# Patient Record
Sex: MALE | Race: WHITE | Employment: FULL TIME | URBAN - METROPOLITAN AREA
[De-identification: names, ages, dates, MRNs, and addresses within clinical notes are randomized per-mention and may not be internally consistent; named-entity substitution may affect disease eponyms.]

---

## 2017-03-17 DIAGNOSIS — I10 HYPERTENSION GOAL BP (BLOOD PRESSURE) < 140/90: Primary | ICD-10-CM

## 2017-03-17 RX ORDER — HYDROCHLOROTHIAZIDE 25 MG/1
TABLET ORAL
Qty: 90 TABLET | Refills: 0 | Status: SHIPPED | OUTPATIENT
Start: 2017-03-17 | End: 2017-07-03

## 2017-03-17 NOTE — TELEPHONE ENCOUNTER
Rx request for med Hydrochlorothiazide 25 mg from [facility].     LOV 12/30/16 with Dr. Kosta Almonte for skin tag removal.    LOV 11/16/16 with Dr. Kosta Almonte for osteoarthritis of left wrist, hypertension, epilepsy, and pre-op exam.    Last CMP done on 11/26/16, Joanna Mckeon

## 2017-04-12 ENCOUNTER — TELEPHONE (OUTPATIENT)
Dept: FAMILY MEDICINE CLINIC | Facility: CLINIC | Age: 62
End: 2017-04-12

## 2017-04-12 ENCOUNTER — OFFICE VISIT (OUTPATIENT)
Dept: FAMILY MEDICINE CLINIC | Facility: CLINIC | Age: 62
End: 2017-04-12

## 2017-04-12 VITALS
HEIGHT: 70 IN | SYSTOLIC BLOOD PRESSURE: 140 MMHG | DIASTOLIC BLOOD PRESSURE: 80 MMHG | BODY MASS INDEX: 33.79 KG/M2 | HEART RATE: 72 BPM | WEIGHT: 236 LBS | TEMPERATURE: 98 F | OXYGEN SATURATION: 99 % | RESPIRATION RATE: 12 BRPM

## 2017-04-12 DIAGNOSIS — M79.10 MUSCLE ACHE: Primary | ICD-10-CM

## 2017-04-12 PROCEDURE — 99213 OFFICE O/P EST LOW 20 MIN: CPT | Performed by: NURSE PRACTITIONER

## 2017-04-12 RX ORDER — CYCLOBENZAPRINE HCL 10 MG
10 TABLET ORAL 3 TIMES DAILY PRN
Qty: 30 TABLET | Refills: 0 | Status: SHIPPED | OUTPATIENT
Start: 2017-04-12 | End: 2017-04-20

## 2017-04-12 RX ORDER — NAPROXEN 500 MG/1
500 TABLET ORAL 2 TIMES DAILY WITH MEALS
Qty: 20 TABLET | Refills: 0 | Status: SHIPPED | OUTPATIENT
Start: 2017-04-12 | End: 2017-04-27

## 2017-04-12 NOTE — PROGRESS NOTES
CHIEF COMPLAINT:     Patient presents with:  Spasms: right upper shoulder area      HPI:   Christine Kemp is a 63year old male who is here for complaints muscle pain. Pain is located at trapezius. Pain is described as aching, sharp.  Severity is moderat /80 mmHg  Pulse 72  Temp(Src) 97.8 °F (36.6 °C) (Oral)  Resp 12  Ht 70\"  Wt 236 lb  BMI 33.86 kg/m2  SpO2 99%   GENERAL: well developed, well nourished,in no apparent distress  SKIN: no rashes,no suspicious lesions  NECK: supple,no adenopathy,no bru Acute neck and back pain usually gets better in 1 to 2 weeks. Pain related to disk disease, arthritis in the spinal joints or spinal stenosis (narrowing of the spinal canal) can become chronic and last for months or years.   Back and neck pain are common pr · When in bed, try to find a position of comfort. A firm mattress is best. Try lying flat on your back with pillows under your knees. You can also try lying on your side with your knees bent up towards your chest and a pillow between your knees.   · At [facility] Follow up with your healthcare provider, or as advised. Physical therapy or further tests may be needed. If X-rays were taken, you will be notified of any new findings that may affect your care.   Call 911  Seek emergency medical care if any of the followi

## 2017-04-12 NOTE — PATIENT INSTRUCTIONS
General Neck and Back Pain    Both neck and back pain are usually caused by injury to the muscles or ligaments of the spine. Sometimes the disks that separate each bone of the spine may cause pain by pressing on a nearby nerve.  Back and neck pain may josh · Poor conditioning, lack of regular exercise  · Spinal disc disease or arthritis  · Stress  · Pregnancy, or illness like appendicitis, bladder or kidney infection, pelvic infections   Home care  · For neck pain: Use a comfortable pillow that supports the · You may use over-the-counter medicine to control pain, unless another pain medicine was prescribed. If you have chronic conditions like diabetes, liver or kidney disease, stomach ulcers,  gastrointestinal bleeding, or are taking blood thinner medicines.

## 2017-04-14 ENCOUNTER — OFFICE VISIT (OUTPATIENT)
Dept: FAMILY MEDICINE CLINIC | Facility: CLINIC | Age: 62
End: 2017-04-14

## 2017-04-14 ENCOUNTER — TELEPHONE (OUTPATIENT)
Dept: FAMILY MEDICINE CLINIC | Facility: CLINIC | Age: 62
End: 2017-04-14

## 2017-04-14 VITALS
DIASTOLIC BLOOD PRESSURE: 70 MMHG | WEIGHT: 228.19 LBS | SYSTOLIC BLOOD PRESSURE: 130 MMHG | HEIGHT: 70 IN | HEART RATE: 80 BPM | BODY MASS INDEX: 32.67 KG/M2 | RESPIRATION RATE: 14 BRPM

## 2017-04-14 DIAGNOSIS — S46.812A STRAIN OF LEFT TRAPEZIUS MUSCLE, INITIAL ENCOUNTER: Primary | ICD-10-CM

## 2017-04-14 PROCEDURE — 99213 OFFICE O/P EST LOW 20 MIN: CPT | Performed by: FAMILY MEDICINE

## 2017-04-14 RX ORDER — METHYLPREDNISOLONE 4 MG/1
TABLET ORAL
Qty: 1 KIT | Refills: 0 | Status: SHIPPED | OUTPATIENT
Start: 2017-04-14 | End: 2017-07-24 | Stop reason: ALTCHOICE

## 2017-04-14 RX ORDER — DIAZEPAM 5 MG/1
5 TABLET ORAL NIGHTLY PRN
Qty: 20 TABLET | Refills: 0 | Status: SHIPPED | OUTPATIENT
Start: 2017-04-14 | End: 2017-04-20

## 2017-04-14 NOTE — PROGRESS NOTES
Saad Ferrer is a 63year old male. HPI:   Tuesday (3 days ago) he started having spasms in the right trapezius muscle. Sx got worse on Wednesday. He went to [facility] and was given naproxen and flexeril. Feels numbness in the right hand to elbow.    [facility] Refills for this Visit:  Signed Prescriptions Disp Refills    methylPREDNISolone (MEDROL) 4 MG Oral Tablet Therapy Pack 1 kit 0      Sig: As directed.       diazepam (VALIUM) 5 MG Oral Tab 20 tablet 0      Sig: Take 1 tablet (5 mg total) by mouth nightly as

## 2017-04-14 NOTE — TELEPHONE ENCOUNTER
Patient went to urgent care on 4/12/17, diagnosed with muscle strain, given naproxen and muscle relaxer, numbness has not improved, gave appt today with Dr Sol Valentino for further eveluation

## 2017-04-20 DIAGNOSIS — M79.10 MUSCLE ACHE: Primary | ICD-10-CM

## 2017-04-20 RX ORDER — DIAZEPAM 5 MG/1
5 TABLET ORAL NIGHTLY PRN
Qty: 10 TABLET | Refills: 0 | OUTPATIENT
Start: 2017-04-20 | End: 2017-04-27

## 2017-04-20 RX ORDER — CYCLOBENZAPRINE HCL 10 MG
10 TABLET ORAL 3 TIMES DAILY PRN
Qty: 10 TABLET | Refills: 0 | Status: SHIPPED | OUTPATIENT
Start: 2017-04-20 | End: 2017-04-27

## 2017-04-20 NOTE — TELEPHONE ENCOUNTER
LOV 4/14/2017 with Dr Hunter Dozier none of this medication on protocol will send to Dr long for ordering

## 2017-04-20 NOTE — TELEPHONE ENCOUNTER
DiazePAM (Tab) VALIUM 5 MG     Cyclobenzaprine HCl (Tab) cyclobenzaprine 10 MG     MethylPREDNISolone (Tablet Therapy Pack) MEDROL DOSEPACK 4 MG     Patient needs about 3-5 days more, pain is getting better but not gone yet.  Please call patient with Rivka De Luna

## 2017-04-27 ENCOUNTER — OFFICE VISIT (OUTPATIENT)
Dept: FAMILY MEDICINE CLINIC | Facility: CLINIC | Age: 62
End: 2017-04-27

## 2017-04-27 VITALS
DIASTOLIC BLOOD PRESSURE: 60 MMHG | WEIGHT: 231 LBS | SYSTOLIC BLOOD PRESSURE: 136 MMHG | HEIGHT: 71 IN | HEART RATE: 72 BPM | BODY MASS INDEX: 32.34 KG/M2 | TEMPERATURE: 98 F | RESPIRATION RATE: 16 BRPM

## 2017-04-27 DIAGNOSIS — S46.811A TRAPEZIUS STRAIN, RIGHT, INITIAL ENCOUNTER: Primary | ICD-10-CM

## 2017-04-27 DIAGNOSIS — M79.10 MUSCLE ACHE: ICD-10-CM

## 2017-04-27 PROCEDURE — 99213 OFFICE O/P EST LOW 20 MIN: CPT | Performed by: FAMILY MEDICINE

## 2017-04-27 RX ORDER — DIAZEPAM 5 MG/1
5 TABLET ORAL NIGHTLY PRN
Qty: 20 TABLET | Refills: 0 | Status: SHIPPED | OUTPATIENT
Start: 2017-04-27 | End: 2017-07-24

## 2017-04-27 RX ORDER — CYCLOBENZAPRINE HCL 10 MG
10 TABLET ORAL 2 TIMES DAILY PRN
Qty: 40 TABLET | Refills: 0 | Status: SHIPPED | OUTPATIENT
Start: 2017-04-27 | End: 2017-07-24 | Stop reason: ALTCHOICE

## 2017-04-27 RX ORDER — METHYLPREDNISOLONE 4 MG/1
TABLET ORAL
Qty: 1 KIT | Refills: 0 | Status: SHIPPED | OUTPATIENT
Start: 2017-04-27 | End: 2017-07-24 | Stop reason: ALTCHOICE

## 2017-04-27 NOTE — PROGRESS NOTES
Gabriela Singh is a 63year old male. HPI:   Pt here 14 days ago, given medrol, valium. For upper back muscle spasm. He had been to [facility] before that. Got refill of valium and flexeril 1 week ago. Pain better, numbness in hand better.   Sx worsened Therapy Pack 1 kit 0      Sig: As directed. Cyclobenzaprine HCl 10 MG Oral Tab 40 tablet 0      Sig: Take 1 tablet (10 mg total) by mouth 2 (two) times daily as needed for Muscle spasms.       diazepam (VALIUM) 5 MG Oral Tab 20 tablet 0      Sig: Take

## 2017-07-03 ENCOUNTER — TELEPHONE (OUTPATIENT)
Dept: FAMILY MEDICINE CLINIC | Facility: CLINIC | Age: 62
End: 2017-07-03

## 2017-07-03 DIAGNOSIS — I10 HYPERTENSION GOAL BP (BLOOD PRESSURE) < 140/90: ICD-10-CM

## 2017-07-03 RX ORDER — HYDROCHLOROTHIAZIDE 25 MG/1
TABLET ORAL
Qty: 90 TABLET | Refills: 3 | Status: SHIPPED | OUTPATIENT
Start: 2017-07-03 | End: 2017-07-24

## 2017-07-03 NOTE — TELEPHONE ENCOUNTER
LOV 4/27/2017 for acute visit last physical 11/16/2016 with Dr Zeferino Degroot has appointment this month refilled per protocol

## 2017-07-03 NOTE — TELEPHONE ENCOUNTER
Patient requesting a refill on HYDROCHLOROTHIAZIDE 25 MG Oral Tab cent to [facility] in [address]

## 2017-07-24 ENCOUNTER — OFFICE VISIT (OUTPATIENT)
Dept: FAMILY MEDICINE CLINIC | Facility: CLINIC | Age: 62
End: 2017-07-24

## 2017-07-24 VITALS
BODY MASS INDEX: 32.21 KG/M2 | SYSTOLIC BLOOD PRESSURE: 136 MMHG | HEART RATE: 76 BPM | HEIGHT: 70 IN | DIASTOLIC BLOOD PRESSURE: 84 MMHG | WEIGHT: 225 LBS | RESPIRATION RATE: 16 BRPM

## 2017-07-24 DIAGNOSIS — Z00.00 LABORATORY EXAMINATION ORDERED AS PART OF A ROUTINE GENERAL MEDICAL EXAMINATION: ICD-10-CM

## 2017-07-24 DIAGNOSIS — G40.901 NONINTRACTABLE EPILEPSY WITH STATUS EPILEPTICUS, UNSPECIFIED EPILEPSY TYPE (HCC): ICD-10-CM

## 2017-07-24 DIAGNOSIS — I10 HYPERTENSION GOAL BP (BLOOD PRESSURE) < 140/90: Primary | ICD-10-CM

## 2017-07-24 PROCEDURE — 99214 OFFICE O/P EST MOD 30 MIN: CPT | Performed by: FAMILY MEDICINE

## 2017-07-24 RX ORDER — CARBAMAZEPINE 200 MG/1
400 CAPSULE, EXTENDED RELEASE ORAL 2 TIMES DAILY
Qty: 360 CAPSULE | Refills: 3 | Status: SHIPPED | OUTPATIENT
Start: 2017-07-24 | End: 2018-07-28

## 2017-07-24 RX ORDER — VALACYCLOVIR HYDROCHLORIDE 1 G/1
2 TABLET, FILM COATED ORAL EVERY 12 HOURS SCHEDULED
Qty: 20 TABLET | Refills: 1 | Status: SHIPPED | OUTPATIENT
Start: 2017-07-24 | End: 2018-01-24 | Stop reason: ALTCHOICE

## 2017-07-24 RX ORDER — VALACYCLOVIR HYDROCHLORIDE 1 G/1
TABLET, FILM COATED ORAL EVERY 12 HOURS SCHEDULED
COMMUNITY
End: 2017-07-24

## 2017-07-24 RX ORDER — HYDROCHLOROTHIAZIDE 25 MG/1
TABLET ORAL
Qty: 90 TABLET | Refills: 3 | Status: SHIPPED | OUTPATIENT
Start: 2017-07-24 | End: 2018-07-28

## 2017-07-24 NOTE — PROGRESS NOTES
HPI:   No chief complaint on file. Gabriela Singh is a 63year old male who presents for recheck of his hypertension. He has been taking medications as instructed, with no medication side effects.  His home BP monitoring in the range of 120's systoli otherwise  SKIN: denies any unusual skin lesions or rashes  RESPIRATORY: denies shortness of breath with exertion  CARDIOVASCULAR: denies chest pain on exertion  GI: denies abdominal pain and denies heartburn  NEURO: denies headaches    EXAM:   /84 ( WITH DIFFERENTIAL WITH PLATELET       good function overall, stable on medications, he is asking for refill on his hydrochlorothiazide as he is having some mild swelling in his ankles and will continue to watch blood pressure.   Reassess in 6 months    Retu

## 2017-08-02 ENCOUNTER — TELEPHONE (OUTPATIENT)
Dept: FAMILY MEDICINE CLINIC | Facility: CLINIC | Age: 62
End: 2017-08-02

## 2017-08-02 DIAGNOSIS — I10 HYPERTENSION GOAL BP (BLOOD PRESSURE) < 140/90: Primary | ICD-10-CM

## 2017-08-02 RX ORDER — HYDROCHLOROTHIAZIDE 25 MG/1
25 TABLET ORAL DAILY
Qty: 10 TABLET | Refills: 0 | Status: SHIPPED | OUTPATIENT
Start: 2017-08-02 | End: 2017-12-08

## 2017-08-02 NOTE — TELEPHONE ENCOUNTER
Pt requesting refill on his hydrochlorothiazide 25 MG Oral Tab. States that it went to his Mail [facility] and won't get on time. Taking last one today. Can he get sent to his [facility]?

## 2017-09-16 LAB
ABSOLUTE BASOPHILS: 29 CELLS/UL (ref 0–200)
ABSOLUTE EOSINOPHILS: 108 CELLS/UL (ref 15–500)
ABSOLUTE LYMPHOCYTES: 1539 CELLS/UL (ref 850–3900)
ABSOLUTE MONOCYTES: 480 CELLS/UL (ref 200–950)
ABSOLUTE NEUTROPHILS: 2744 CELLS/UL (ref 1500–7800)
ALBUMIN/GLOBULIN RATIO: 1.7 (CALC) (ref 1–2.5)
ALBUMIN: 4.2 G/DL (ref 3.6–5.1)
ALKALINE PHOSPHATASE: 69 U/L (ref 40–115)
ALT: 14 U/L (ref 9–46)
AST: 18 U/L (ref 10–35)
BASOPHILS: 0.6 %
BILIRUBIN, TOTAL: 0.6 MG/DL (ref 0.2–1.2)
BUN: 21 MG/DL (ref 7–25)
CALCIUM: 9.2 MG/DL (ref 8.6–10.3)
CARBAMAZEPINE, TOTAL: 8.6 MG/L (ref 4–12)
CARBON DIOXIDE: 32 MMOL/L (ref 20–31)
CHLORIDE: 103 MMOL/L (ref 98–110)
CHOL/HDLC RATIO: 3.6 (CALC)
CHOLESTEROL, TOTAL: 186 MG/DL
CREATININE: 0.91 MG/DL (ref 0.7–1.25)
EGFR IF AFRICN AM: 106 ML/MIN/1.73M2
EGFR IF NONAFRICN AM: 91 ML/MIN/1.73M2
EOSINOPHILS: 2.2 %
GLOBULIN: 2.5 G/DL (CALC) (ref 1.9–3.7)
GLUCOSE: 97 MG/DL (ref 65–99)
HDL CHOLESTEROL: 51 MG/DL
HEMATOCRIT: 41.9 % (ref 38.5–50)
HEMOGLOBIN: 14.6 G/DL (ref 13.2–17.1)
LDL-CHOLESTEROL: 115 MG/DL (CALC)
LYMPHOCYTES: 31.4 %
MCH: 31.7 PG (ref 27–33)
MCHC: 34.8 G/DL (ref 32–36)
MCV: 90.9 FL (ref 80–100)
MONOCYTES: 9.8 %
MPV: 12.5 FL (ref 7.5–12.5)
NEUTROPHILS: 56 %
NON-HDL CHOLESTEROL: 135 MG/DL (CALC)
PLATELET COUNT: 183 THOUSAND/UL (ref 140–400)
POTASSIUM: 4.1 MMOL/L (ref 3.5–5.3)
PROTEIN, TOTAL: 6.7 G/DL (ref 6.1–8.1)
RDW: 12.4 % (ref 11–15)
RED BLOOD CELL COUNT: 4.61 MILLION/UL (ref 4.2–5.8)
SODIUM: 141 MMOL/L (ref 135–146)
TRIGLYCERIDES: 92 MG/DL
WHITE BLOOD CELL COUNT: 4.9 THOUSAND/UL (ref 3.8–10.8)

## 2017-12-08 ENCOUNTER — TELEPHONE (OUTPATIENT)
Dept: FAMILY MEDICINE CLINIC | Facility: CLINIC | Age: 62
End: 2017-12-08

## 2017-12-08 DIAGNOSIS — I10 HYPERTENSION GOAL BP (BLOOD PRESSURE) < 140/90: ICD-10-CM

## 2017-12-08 RX ORDER — HYDROCHLOROTHIAZIDE 25 MG/1
25 TABLET ORAL DAILY
Qty: 10 TABLET | Refills: 0 | Status: SHIPPED | OUTPATIENT
Start: 2017-12-08 | End: 2018-01-24

## 2017-12-08 NOTE — TELEPHONE ENCOUNTER
Pt needs a refill until Elie appt has 4 pills left the [facility] order said they did not have any refills.   Please call into [email or web address] [place], [id number], [phone], [phone] when he get

## 2017-12-08 NOTE — TELEPHONE ENCOUNTER
Spoke with [facility] and they state they received 7/24/17 script for HCTZ #90 with 3 refills, script was filled in 7/25/17 #90 then on 8/2/17l [facility] on [address] pulled scripts from [facility] and filled a 30 day supply.  [facility]

## 2018-01-24 ENCOUNTER — OFFICE VISIT (OUTPATIENT)
Dept: FAMILY MEDICINE CLINIC | Facility: CLINIC | Age: 63
End: 2018-01-24

## 2018-01-24 VITALS
DIASTOLIC BLOOD PRESSURE: 60 MMHG | BODY MASS INDEX: 32.48 KG/M2 | RESPIRATION RATE: 14 BRPM | HEIGHT: 71 IN | TEMPERATURE: 97 F | HEART RATE: 76 BPM | WEIGHT: 232 LBS | SYSTOLIC BLOOD PRESSURE: 128 MMHG

## 2018-01-24 DIAGNOSIS — G47.33 OSA (OBSTRUCTIVE SLEEP APNEA): ICD-10-CM

## 2018-01-24 DIAGNOSIS — I10 HYPERTENSION GOAL BP (BLOOD PRESSURE) < 140/90: Primary | ICD-10-CM

## 2018-01-24 DIAGNOSIS — G40.901 NONINTRACTABLE EPILEPSY WITH STATUS EPILEPTICUS, UNSPECIFIED EPILEPSY TYPE (HCC): ICD-10-CM

## 2018-01-24 DIAGNOSIS — R60.0 LOCALIZED EDEMA: ICD-10-CM

## 2018-01-24 PROCEDURE — 99214 OFFICE O/P EST MOD 30 MIN: CPT | Performed by: FAMILY MEDICINE

## 2018-01-24 NOTE — PROGRESS NOTES
Patient presents with:  Hypertension  Colonoscopy Screening: pt is due       HPI:   This is a 63year old male coming in for seizures, seeing doctor closer in future. Stable overall. Declined flu, on sx meds x 29 years. ocverall stable.     HPI     He  repo for immunocompromised state. Neurological: Negative. Negative for dizziness, weakness and numbness. Hematological: Negative for adenopathy.         EXAM:   /60   Pulse 76   Temp (!) 97.3 °F (36.3 °C)   Resp 14   Ht 71\"   Wt 232 lb   BMI 32.36 kg 1/24/2018, 10:31 AM

## 2018-01-25 NOTE — ASSESSMENT & PLAN NOTE
Seems to have WAYNE sx, will need surgery soon, arrange sleep study. 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 EEG part of sleep study. He wants to continue to use meds, despite no sx in 29 years.

## 2018-01-31 ENCOUNTER — TELEPHONE (OUTPATIENT)
Dept: FAMILY MEDICINE CLINIC | Facility: CLINIC | Age: 63
End: 2018-01-31

## 2018-07-28 DIAGNOSIS — I10 HYPERTENSION GOAL BP (BLOOD PRESSURE) < 140/90: ICD-10-CM

## 2018-07-28 DIAGNOSIS — G40.901 NONINTRACTABLE EPILEPSY WITH STATUS EPILEPTICUS, UNSPECIFIED EPILEPSY TYPE (HCC): ICD-10-CM

## 2018-07-31 RX ORDER — CARBAMAZEPINE 200 MG/1
CAPSULE, EXTENDED RELEASE ORAL
Qty: 360 CAPSULE | Refills: 0 | Status: SHIPPED | OUTPATIENT
Start: 2018-07-31

## 2018-07-31 RX ORDER — HYDROCHLOROTHIAZIDE 25 MG/1
TABLET ORAL
Qty: 90 TABLET | Refills: 1 | Status: SHIPPED | OUTPATIENT
Start: 2018-07-31

## 2019-02-09 DIAGNOSIS — I10 HYPERTENSION GOAL BP (BLOOD PRESSURE) < 140/90: ICD-10-CM

## 2019-02-13 ENCOUNTER — TELEPHONE (OUTPATIENT)
Dept: FAMILY MEDICINE CLINIC | Facility: CLINIC | Age: 64
End: 2019-02-13

## 2019-02-13 RX ORDER — HYDROCHLOROTHIAZIDE 25 MG/1
TABLET ORAL
Qty: 90 TABLET | Refills: 0 | OUTPATIENT
Start: 2019-02-13

## 2019-02-13 NOTE — TELEPHONE ENCOUNTER
Called patient and he stated that this has been taken care of by his current doctor, patient will no longer be a patient of our office.

## 2019-02-13 NOTE — TELEPHONE ENCOUNTER
Called patient about a refill and patient stated he is no longer a patient of our office, please remove PCP

## 2022-09-19 NOTE — TELEPHONE ENCOUNTER
Told patient we have no openings at this time sent him to [facility] to be seen right away patient agrees to this Warm

## (undated) NOTE — MR AVS SNAPSHOT
[address] [facility] [address]  [facility],  [address]  [address] [place] [address] [phone]               Thank you for choosing us for your health care visit with Jose Nazario MD.  We are glad to serve you and happy to provide you with this noe Assoc Dx:   Trapezius strain, right, initial encounter [M84.855Z]          Reason for Today's Visit     Numbness           Medical Issues Discussed Today     Trapezius strain, right, initial encounter    -  Primary    Muscle ache          Instructions and These medications were sent to [facility] [address] [facility], [address] [place] [address], [phone], [phone]  [address] [place] [address], [address] [place] [address]     Phone:  [phone]    - Cyclobenzaprine HCl 10 MG Tabs  - methylPREDNISolo

## (undated) NOTE — MR AVS SNAPSHOT
[facility]  [address] [facility] [address] [phone]               Thank you for choosing us for your health care visit with ADITYA Muñoz.   We are glad to serve you and happy to provide you with this summary · Pain can be localized to one spot or area, or it can be more generalized  · Pain can spread or radiate upwards, downwards, to the front, or go down your arms  · Muscle spasm may occur.   Most of the time mechanical problems with the muscles or spine cause the painful area for 20 minutes and then remove it for 20 minutes over a period of 60 to 90 minutes or several times a day.   · You can alternate ice and heat therapies.  Talk with your healthcare provider about the best treatment for your back or neck pain · Fever of 100.4ºF (38ºC) or higher, or as directed by your healthcare provider  Date Last Reviewed: 7/1/2016  © 5705-3244 The [address] [place] [address], [address] [place] [address]. All rights reserved.  This information is not intended as a subs Summaries. If you've been to the Emergency Department or your doctor's office, you can view your past visit information in [employer] by going to Visits < Visit Summaries. [employer] questions? Call [phone] for help.   [employer] is NOT to be used for urge

## (undated) NOTE — MR AVS SNAPSHOT
[address] [facility] [address]  [facility],  [address]  [address] [place] [address] [phone]               Thank you for choosing us for your health care visit with Steve Rodríguez MD.  We are glad to serve you and happy to provide you with this noe These medications were sent to [address], [address] [phone], [phone]  [address], [address] [id number]     Phone:  [phone] - methylprednisolone 4